# Patient Record
Sex: FEMALE | Race: OTHER | URBAN - METROPOLITAN AREA
[De-identification: names, ages, dates, MRNs, and addresses within clinical notes are randomized per-mention and may not be internally consistent; named-entity substitution may affect disease eponyms.]

---

## 2017-01-06 ENCOUNTER — OFFICE VISIT (OUTPATIENT)
Dept: INTERNAL MEDICINE CLINIC | Facility: CLINIC | Age: 57
End: 2017-01-06

## 2017-01-06 VITALS
HEART RATE: 71 BPM | OXYGEN SATURATION: 99 % | SYSTOLIC BLOOD PRESSURE: 121 MMHG | DIASTOLIC BLOOD PRESSURE: 72 MMHG | WEIGHT: 170.19 LBS | TEMPERATURE: 99 F

## 2017-01-06 DIAGNOSIS — J06.9 URI, ACUTE: ICD-10-CM

## 2017-01-06 DIAGNOSIS — H10.89 OTHER CONJUNCTIVITIS: Primary | ICD-10-CM

## 2017-01-06 PROCEDURE — 99212 OFFICE O/P EST SF 10 MIN: CPT | Performed by: INTERNAL MEDICINE

## 2017-01-06 PROCEDURE — 99214 OFFICE O/P EST MOD 30 MIN: CPT | Performed by: INTERNAL MEDICINE

## 2017-01-06 RX ORDER — TOBRAMYCIN AND DEXAMETHASONE 3; 1 MG/ML; MG/ML
2 SUSPENSION/ DROPS OPHTHALMIC
Qty: 5 ML | Refills: 0 | Status: SHIPPED | OUTPATIENT
Start: 2017-01-06 | End: 2017-02-21

## 2017-01-06 RX ORDER — AZITHROMYCIN 250 MG/1
TABLET, FILM COATED ORAL
Qty: 1 PACKAGE | Refills: 0 | Status: SHIPPED | OUTPATIENT
Start: 2017-01-06 | End: 2017-02-21

## 2017-01-06 NOTE — PROGRESS NOTES
HPI:    Patient ID: Carlos A Reyna is a 55year old female. Established patient; she last saw me on 9/27/2016 for bronchitis, facial lesion and blurred vision. HPI     Cough  This is a new problem.  Pertinent negatives include no chest pain, fever well-developed and well-nourished. HENT:   Head: Normocephalic and atraumatic.    Right Ear: External ear normal.   Left Ear: External ear normal.   Nose: Nose normal.   Mouth/Throat: Oropharynx is clear and moist.   Eyes: EOM are normal.   Right eye eryt (ZITHROMAX Z-FRANKIE) 250 MG Oral Tab 1 Package 0      Sig: Take 2 tablets first day. Then 1 tablet daily after.       tobramycin-dexamethasone (TOBRADEX) 0.3-0.1 % Ophthalmic Suspension 5 mL 0      Sig: Place 2 drops into the right eye every 4 (four) hours [facility]

## 2017-01-26 ENCOUNTER — TELEPHONE (OUTPATIENT)
Dept: INTERNAL MEDICINE CLINIC | Facility: CLINIC | Age: 57
End: 2017-01-26

## 2017-01-26 DIAGNOSIS — H53.8 BLURRY VISION: ICD-10-CM

## 2017-01-26 DIAGNOSIS — L98.9 SKIN PROBLEM: Primary | ICD-10-CM

## 2017-01-26 NOTE — TELEPHONE ENCOUNTER
Pt would like two referrals. 1. Pt would like a referral to see Dermatology for a spot on her nose. Per pt she does not have an appt scheduled yet. Per pt she had a referral last year, but, it .     2. Pt would like referral for Dr. Luz Elena Fisher

## 2017-02-13 ENCOUNTER — TELEPHONE (OUTPATIENT)
Dept: OPHTHALMOLOGY | Facility: CLINIC | Age: 57
End: 2017-02-13

## 2017-02-13 NOTE — TELEPHONE ENCOUNTER
Pt complains of blurred vision and headaches over the past year. Pt broke her last glasses Rx. Apt scheduled for EE on 2/21/17 with ART[facility].

## 2017-02-21 ENCOUNTER — OFFICE VISIT (OUTPATIENT)
Dept: OPHTHALMOLOGY | Facility: CLINIC | Age: 57
End: 2017-02-21

## 2017-02-21 DIAGNOSIS — H02.88B MEIBOMIAN GLAND DYSFUNCTION (MGD), BILATERAL, BOTH UPPER AND LOWER LIDS: ICD-10-CM

## 2017-02-21 DIAGNOSIS — H52.4 MYOPIA OF BOTH EYES WITH ASTIGMATISM AND PRESBYOPIA: ICD-10-CM

## 2017-02-21 DIAGNOSIS — H52.13 MYOPIA OF BOTH EYES WITH ASTIGMATISM AND PRESBYOPIA: ICD-10-CM

## 2017-02-21 DIAGNOSIS — H52.203 MYOPIA OF BOTH EYES WITH ASTIGMATISM AND PRESBYOPIA: ICD-10-CM

## 2017-02-21 DIAGNOSIS — H43.393 FLOATERS, BILATERAL: ICD-10-CM

## 2017-02-21 DIAGNOSIS — R51.9 HEADACHE, UNSPECIFIED HEADACHE TYPE: Primary | ICD-10-CM

## 2017-02-21 DIAGNOSIS — H25.13 AGE-RELATED NUCLEAR CATARACT OF BOTH EYES: ICD-10-CM

## 2017-02-21 DIAGNOSIS — H02.88A MEIBOMIAN GLAND DYSFUNCTION (MGD), BILATERAL, BOTH UPPER AND LOWER LIDS: ICD-10-CM

## 2017-02-21 PROBLEM — H43.399 FLOATERS: Status: ACTIVE | Noted: 2017-02-21

## 2017-02-21 PROBLEM — R51 HEADACHE: Status: ACTIVE | Noted: 2017-02-21

## 2017-02-21 PROCEDURE — 99212 OFFICE O/P EST SF 10 MIN: CPT | Performed by: OPHTHALMOLOGY

## 2017-02-21 PROCEDURE — 92015 DETERMINE REFRACTIVE STATE: CPT | Performed by: OPHTHALMOLOGY

## 2017-02-21 PROCEDURE — 99243 OFF/OP CNSLTJ NEW/EST LOW 30: CPT | Performed by: OPHTHALMOLOGY

## 2017-02-21 NOTE — PROGRESS NOTES
Jorje Bacon is a 55year old female. HPI:     HPI     Consult    Additional comments: Referral from Dr. Harriet Schumacher            Comments   Pt complains of blurred vision and headaches over the past 2 years.  Pt has not had an eye exam in over 3 years and w Meibomian gland dysfunction    Conjunctiva/Sclera Nasal/temp pinguecula Nasal/temp pinguecula    Cornea Clear Clear    Anterior Chamber Deep and quiet Deep and quiet    Iris Normal Normal    Lens Trace Nuclear sclerosis, Trace Cortical cataract temporally forever for ocular comfort. Meibomian gland dysfunction information given. No orders of the defined types were placed in this encounter.        Meds This Visit:    No prescriptions requested or ordered in this encounter     Follow up instructions:

## 2017-02-21 NOTE — PATIENT INSTRUCTIONS
Age-related nuclear cataract of both eyes  Discussed mild cataracts in both eyes that are not affecting vision and are not surgical at this time.     Reassured patient that other than cataracts, eyes look very healthy; there is no evidence of glaucoma or ma

## 2017-02-21 NOTE — ASSESSMENT & PLAN NOTE
Patient is instructed to use warm compresses twice a day to both eyelids forever for ocular comfort. Meibomian gland dysfunction information given.

## 2017-02-21 NOTE — ASSESSMENT & PLAN NOTE
Discussed mild cataracts in both eyes that are not affecting vision and are not surgical at this time. Reassured patient that other than cataracts, eyes look very healthy; there is no evidence of glaucoma or macular degeneration in either eye.    Bianca Rowley

## 2017-02-21 NOTE — ASSESSMENT & PLAN NOTE
Patient advised that ocular health is normal in both eyes; no ocular cause of headache found. If symptoms persist, follow up with primary care provider.

## 2017-03-02 ENCOUNTER — OFFICE VISIT (OUTPATIENT)
Dept: DERMATOLOGY CLINIC | Facility: CLINIC | Age: 57
End: 2017-03-02

## 2017-03-02 ENCOUNTER — APPOINTMENT (OUTPATIENT)
Dept: LAB | Age: 57
End: 2017-03-02
Attending: DERMATOLOGY
Payer: MEDICAID

## 2017-03-02 DIAGNOSIS — L81.4 SOLAR LENTIGO: ICD-10-CM

## 2017-03-02 DIAGNOSIS — D23.30 BENIGN NEOPLASM OF SKIN OF FACE: ICD-10-CM

## 2017-03-02 DIAGNOSIS — D48.5 NEOPLASM OF UNCERTAIN BEHAVIOR OF SKIN: Primary | ICD-10-CM

## 2017-03-02 DIAGNOSIS — L70.0 COMEDONE: ICD-10-CM

## 2017-03-02 PROCEDURE — 88305 TISSUE EXAM BY PATHOLOGIST: CPT | Performed by: DERMATOLOGY

## 2017-03-02 PROCEDURE — 99201 OFFICE/OUTPT VISIT,NEW,LEVL I: CPT | Performed by: DERMATOLOGY

## 2017-03-02 PROCEDURE — 99212 OFFICE O/P EST SF 10 MIN: CPT | Performed by: DERMATOLOGY

## 2017-03-02 PROCEDURE — 11311 SHAVE SKIN LESION 0.6-1.0 CM: CPT | Performed by: DERMATOLOGY

## 2017-03-02 NOTE — PROGRESS NOTES
HPI:     Chief Complaint     Moles        HPI     Moles    Additional comments: New pt presenting with mole near L nare of nose. c/o increase in size, itching and pain 4/10. Pt denies personal and family hx of skin cancer.        Last edited by Shonda Carranza the lateral malar areas.   Also some hyperpigmentation lesion appears to be benign intradermal nevus or combined nevus, however in light of the      ASSESSMENT/PLAN:   Neoplasm of uncertain behavior of skin  (primary encounter diagnosis)-fact the patient an

## (undated) NOTE — Clinical Note
February 21, 2017    Rose Yee MD  [facility] [address]     Patient: Aure Rowland   YOB: 1960   Date of Visit: 2/21/2017       Dear Dr. Jessica Solis MD:    Thank you for referring Aure Rowland to me for evaluation Extraocular Movement      Right Left   Result Full, Ortho Full, Ortho         Dilation     Both eyes:  1.0% Mydriacyl and 2.5% Gary Synephrine @ 9:54 AM            Slit Lamp and Fundus Exam     Slit Lamp Exam      Right Left    Lids/Lashes Dermatochalasis, Patient advised that ocular health is normal in both eyes; no ocular cause of headache found. If symptoms persist, follow up with primary care provider. Floaters  No treatment.      Meibomian gland dysfunction (MGD), bilateral, both upper and lower

## (undated) NOTE — MR AVS SNAPSHOT
[place]  [address]  [phone]               Thank you for choosing us for your health care visit with Karrie Sandoval MD.  We are glad to serve you and happy to provide you with this summa cause of headache found. If symptoms persist, follow up with primary care provider. Floaters  No treatment.      Meibomian gland dysfunction (MGD), bilateral, both upper and lower lids  Patient is instructed to use warm compresses twice a day to both

## (undated) NOTE — Clinical Note
3/4/2017              [address] [facility]         Judie Kern,      The report of the Biopsy done on 3/2/17 shows an Intradermal nevus.   This is a benign (not cancerous) growth, and requires no further treatment

## (undated) NOTE — MR AVS SNAPSHOT
[address], [address]  [address] [facility]  [phone]               Thank you for choosing us for your health care visit with Zeina Barahona MD.  We are glad to serve you and happy to provide you with this summary - azithromycin 250 MG Tabs  - tobramycin-dexamethasone 0.3-0.1 % Susp            Referral Information     Referral Order Referred to [address] Phone Visits Status Diagnosis           URI, acute [[id number]]                 MyChart                  Visit ED